# Patient Record
Sex: MALE | Race: WHITE | URBAN - METROPOLITAN AREA
[De-identification: names, ages, dates, MRNs, and addresses within clinical notes are randomized per-mention and may not be internally consistent; named-entity substitution may affect disease eponyms.]

---

## 2018-10-19 ENCOUNTER — FOLLOW UP ESTABLISHED (OUTPATIENT)
Dept: URBAN - METROPOLITAN AREA CLINIC 10 | Facility: CLINIC | Age: 83
End: 2018-10-19
Payer: COMMERCIAL

## 2018-10-19 PROCEDURE — 92082 INTERMEDIATE VISUAL FIELD XM: CPT | Performed by: OPHTHALMOLOGY

## 2018-10-19 PROCEDURE — 92134 CPTRZ OPH DX IMG PST SGM RTA: CPT | Performed by: OPHTHALMOLOGY

## 2018-10-19 PROCEDURE — 92014 COMPRE OPH EXAM EST PT 1/>: CPT | Performed by: OPHTHALMOLOGY

## 2018-10-19 RX ORDER — TIMOLOL MALEATE 5 MG/ML
0.5 % SOLUTION/ DROPS OPHTHALMIC
Qty: 3 | Refills: 1 | Status: INACTIVE
Start: 2018-10-19 | End: 2018-11-13

## 2018-10-19 RX ORDER — LATANOPROST 50 UG/ML
0.005 % SOLUTION OPHTHALMIC
Qty: 3 | Refills: 1 | Status: INACTIVE
Start: 2018-10-19 | End: 2018-11-13

## 2018-10-19 RX ORDER — DORZOLAMIDE HYDROCHLORIDE 20 MG/ML
2 % SOLUTION OPHTHALMIC
Qty: 3 | Refills: 1 | Status: INACTIVE
Start: 2018-10-19 | End: 2018-11-13

## 2018-10-19 ASSESSMENT — INTRAOCULAR PRESSURE
OS: 19
OD: 18

## 2018-10-19 ASSESSMENT — VISUAL ACUITY
OS: 20/30
OD: 20/25

## 2018-10-19 ASSESSMENT — KERATOMETRY
OD: 44.50
OS: 44.13

## 2018-11-13 ENCOUNTER — FOLLOW UP ESTABLISHED (OUTPATIENT)
Dept: URBAN - METROPOLITAN AREA CLINIC 30 | Facility: CLINIC | Age: 83
End: 2018-11-13
Payer: COMMERCIAL

## 2018-11-13 PROCEDURE — 92015 DETERMINE REFRACTIVE STATE: CPT | Performed by: OPHTHALMOLOGY

## 2018-11-13 PROCEDURE — 92083 EXTENDED VISUAL FIELD XM: CPT | Performed by: OPHTHALMOLOGY

## 2018-11-13 PROCEDURE — 92012 INTRM OPH EXAM EST PATIENT: CPT | Performed by: OPHTHALMOLOGY

## 2018-11-13 RX ORDER — TIMOLOL MALEATE 5 MG/ML
0.5 % SOLUTION/ DROPS OPHTHALMIC
Qty: 3 | Refills: 1 | Status: INACTIVE
Start: 2018-11-13 | End: 2021-06-22

## 2018-11-13 RX ORDER — LATANOPROST 50 UG/ML
0.005 % SOLUTION OPHTHALMIC
Qty: 3 | Refills: 1 | Status: INACTIVE
Start: 2018-11-13 | End: 2019-05-23

## 2018-11-13 RX ORDER — DORZOLAMIDE HYDROCHLORIDE 20 MG/ML
2 % SOLUTION OPHTHALMIC
Qty: 3 | Refills: 1 | Status: INACTIVE
Start: 2018-11-13 | End: 2021-06-22

## 2018-11-13 ASSESSMENT — INTRAOCULAR PRESSURE
OS: 16
OD: 16

## 2021-06-22 ENCOUNTER — OFFICE VISIT (OUTPATIENT)
Dept: URBAN - METROPOLITAN AREA CLINIC 30 | Facility: CLINIC | Age: 86
End: 2021-06-22
Payer: COMMERCIAL

## 2021-06-22 PROCEDURE — 92133 CPTRZD OPH DX IMG PST SGM ON: CPT | Performed by: OPHTHALMOLOGY

## 2021-06-22 PROCEDURE — 99214 OFFICE O/P EST MOD 30 MIN: CPT | Performed by: OPHTHALMOLOGY

## 2021-06-22 PROCEDURE — 92134 CPTRZ OPH DX IMG PST SGM RTA: CPT | Performed by: OPHTHALMOLOGY

## 2021-06-22 RX ORDER — DORZOLAMIDE HYDROCHLORIDE 20 MG/ML
2 % SOLUTION OPHTHALMIC
Qty: 6 | Refills: 3 | Status: INACTIVE
Start: 2021-06-22 | End: 2022-01-18

## 2021-06-22 RX ORDER — LATANOPROST 50 UG/ML
0.005 % SOLUTION OPHTHALMIC
Qty: 6 | Refills: 3 | Status: INACTIVE
Start: 2021-06-22 | End: 2022-01-18

## 2021-06-22 RX ORDER — TIMOLOL MALEATE 5 MG/ML
0.5 % SOLUTION/ DROPS OPHTHALMIC
Qty: 6 | Refills: 3 | Status: INACTIVE
Start: 2021-06-22 | End: 2022-01-18

## 2021-06-22 ASSESSMENT — INTRAOCULAR PRESSURE
OD: 18
OS: 17

## 2021-06-22 ASSESSMENT — VISUAL ACUITY
OS: 20/60
OD: 20/30

## 2021-06-22 ASSESSMENT — KERATOMETRY
OD: 44.47
OS: 44.15

## 2021-06-22 NOTE — IMPRESSION/PLAN
Impression: Primary open-angle glaucoma, bilateral, severe stage: J48.9900. Plan: Discussed with patient today's findings. Emphasized and explained compliance with medications. Poor compliance can lead to blindness. 
Cont Lat QHS OU, Timolol QAM OU, Trusopt TID OU
ERX'd

## 2021-09-14 ENCOUNTER — OFFICE VISIT (OUTPATIENT)
Dept: URBAN - METROPOLITAN AREA CLINIC 30 | Facility: CLINIC | Age: 86
End: 2021-09-14
Payer: MEDICARE

## 2021-09-14 DIAGNOSIS — Z96.1 PRESENCE OF INTRAOCULAR LENS: ICD-10-CM

## 2021-09-14 DIAGNOSIS — H40.1133 PRIMARY OPEN-ANGLE GLAUCOMA, BILATERAL, SEVERE STAGE: Primary | ICD-10-CM

## 2021-09-14 PROCEDURE — 92012 INTRM OPH EXAM EST PATIENT: CPT | Performed by: OPHTHALMOLOGY

## 2021-09-14 ASSESSMENT — INTRAOCULAR PRESSURE
OD: 13
OS: 12

## 2021-09-14 NOTE — IMPRESSION/PLAN
Impression: Primary open-angle glaucoma, bilateral, severe stage: V88.0526. Plan: Discussed with patient today's findings. Emphasized and explained compliance with medications. Poor compliance can lead to blindness. Cont Lat QHS OU, Timolol QAM OU, Trusopt TID OU
VF not working today See Dr Lauren Payment

## 2021-11-17 ENCOUNTER — OFFICE VISIT (OUTPATIENT)
Dept: URBAN - METROPOLITAN AREA CLINIC 24 | Facility: CLINIC | Age: 86
End: 2021-11-17
Payer: MEDICARE

## 2021-11-17 PROCEDURE — 92133 CPTRZD OPH DX IMG PST SGM ON: CPT | Performed by: OPHTHALMOLOGY

## 2021-11-17 PROCEDURE — 92020 GONIOSCOPY: CPT | Performed by: OPHTHALMOLOGY

## 2021-11-17 PROCEDURE — 92083 EXTENDED VISUAL FIELD XM: CPT | Performed by: OPHTHALMOLOGY

## 2021-11-17 PROCEDURE — 99214 OFFICE O/P EST MOD 30 MIN: CPT | Performed by: OPHTHALMOLOGY

## 2021-11-17 ASSESSMENT — INTRAOCULAR PRESSURE
OS: 18
OD: 17

## 2021-11-17 NOTE — IMPRESSION/PLAN
Impression: Primary open-angle glaucoma, bilateral, severe stage: O76.0974. Plan: Pt has Glaucoma    Gonio : SS 1+       Pachs:550/570      Today's IOP :17/18 (did not use drops today)          Tmax  :  32/34 Target IOP low to mid teens Pt denies Fhx of Glaucoma Right  eye is the better seeing eye (Last vf OD inferior and nasal changes OS dense loss 11/17/21 C/D:  0.9x0.9 360 PPA
OCT:59/59 Pt denies Sulfa Allergy   // Pt denies Lung /Heart dx Plan :
1. Continue Lat QHS OU Timolol QAM OU Trusopt TID OU 2. Discussed with patient IOP is high however he confirms he did not use drops today. Will have patient return sooner to confirm IOP is controlled. If stable will have patient return to referring provider for medical management 3. Discussed details about Glaucoma and that without proper control of pressures irreversible blindness can occur. Patient understands risks. Emphasize compliance with drop and without compliance vision loss progression can occur. 
4. RTC 2 month IOP

## 2022-01-12 ENCOUNTER — OFFICE VISIT (OUTPATIENT)
Dept: URBAN - METROPOLITAN AREA CLINIC 24 | Facility: CLINIC | Age: 87
End: 2022-01-12
Payer: MEDICARE

## 2022-01-12 PROCEDURE — 99214 OFFICE O/P EST MOD 30 MIN: CPT | Performed by: OPHTHALMOLOGY

## 2022-01-12 NOTE — IMPRESSION/PLAN
Impression: Primary open-angle glaucoma, bilateral, severe stage: E45.0815. Plan: Pt has Glaucoma    Gonio : SS 1+       Pachs:550/570      Today's IOP :16/18       Tmax  :  32/34 Target IOP low to mid teens Pt denies Fhx of Glaucoma Right  eye is the better seeing eye (Last vf OD inferior and nasal changes OS dense loss 11/17/21 C/D:  0.9x0.9 360 PPA
OCT:59/59 Pt denies Sulfa Allergy   // Pt denies Lung /Heart dx Plan :
1. Continue Lat QHS OU Timolol QAM OU Trusopt TID OU 2. Discussed with patient IOP is high even with drop compliance. Recommend additional treatment 3. Recommend SLT OD then OS  360 The patient is aware of the limitations of SLT , which can lower IOP 2-4mm for 6--12 months. The Patient is aware that SLT cannot improve the vision nor eliminate the need for the topical medications. If on any glaucoma medications, the patient is aware that SLT is not a replacement for the current medical regimen. **Risk level 1
*** 6-8 week Follow up after laser **Pred TID x 1 week

## 2022-01-28 ENCOUNTER — SURGERY (OUTPATIENT)
Dept: URBAN - METROPOLITAN AREA SURGERY 12 | Facility: SURGERY | Age: 87
End: 2022-01-28
Payer: MEDICARE

## 2022-01-28 PROCEDURE — 65855 TRABECULOPLASTY LASER SURG: CPT | Performed by: OPHTHALMOLOGY

## 2022-01-28 RX ORDER — PREDNISOLONE ACETATE 10 MG/ML
1 % SUSPENSION/ DROPS OPHTHALMIC
Qty: 1 | Refills: 0 | Status: ACTIVE
Start: 2022-01-28

## 2022-02-11 ENCOUNTER — SURGERY (OUTPATIENT)
Dept: URBAN - METROPOLITAN AREA SURGERY 12 | Facility: SURGERY | Age: 87
End: 2022-02-11
Payer: MEDICARE

## 2022-02-11 PROCEDURE — 65855 TRABECULOPLASTY LASER SURG: CPT | Performed by: OPHTHALMOLOGY

## 2022-04-04 ENCOUNTER — OFFICE VISIT (OUTPATIENT)
Dept: URBAN - METROPOLITAN AREA CLINIC 24 | Facility: CLINIC | Age: 87
End: 2022-04-04
Payer: MEDICARE

## 2022-04-04 PROCEDURE — 99213 OFFICE O/P EST LOW 20 MIN: CPT | Performed by: OPHTHALMOLOGY

## 2022-04-04 ASSESSMENT — INTRAOCULAR PRESSURE
OD: 10
OS: 12

## 2022-04-04 NOTE — IMPRESSION/PLAN
Impression: Primary open-angle glaucoma, bilateral, severe stage: M14.5090.
-s/p SLT OU 2022 Plan: Pt has Glaucoma    Gonio : SS 1+       Pachs:550/570      Today's IOP : 10/12       Tmax  :  32/34 Target IOP low to mid teens Pt denies Fhx of Glaucoma Right  eye is the better seeing eye (Last vf OD inferior and nasal changes OS dense loss 11/17/21 C/D:  0.9x0.9 360 PPA
OCT:59/59 Pt denies Sulfa Allergy // Pt denies Lung /Heart dx Plan :
1. Continue Latanoprost QHS OU Timolol QAM OU Trusopt TID OU
2. s/p SLT OU and IOP is doing well today 04/04/22 3.  RTC in 3-4 months with Dr. Michel Garcia for IOP check with VF testing

## 2022-05-04 ENCOUNTER — OFFICE VISIT (OUTPATIENT)
Dept: URBAN - METROPOLITAN AREA CLINIC 24 | Facility: CLINIC | Age: 87
End: 2022-05-04
Payer: MEDICARE

## 2022-05-04 DIAGNOSIS — H40.1133 PRIMARY OPEN-ANGLE GLAUCOMA, BILATERAL, SEVERE STAGE: Primary | ICD-10-CM

## 2022-05-04 PROCEDURE — 99213 OFFICE O/P EST LOW 20 MIN: CPT | Performed by: OPHTHALMOLOGY

## 2022-05-04 ASSESSMENT — INTRAOCULAR PRESSURE
OD: 15
OS: 22

## 2022-05-04 NOTE — IMPRESSION/PLAN
Impression: Primary open-angle glaucoma, bilateral, severe stage: H40.1133.
-s/p SLT OU 2022 Plan: Pt has Glaucoma    Gonio : SS 1+       Pachs:550/570      Today's IOP :15/22       Tmax  :  32/34 Target IOP low to mid teens Pt denies Fhx of Glaucoma Right  eye is the better seeing eye (Last vf OD inferior and nasal changes OS dense loss 11/17/21 C/D:  0.9x0.9 360 PPA
OCT:59/59 Pt denies Sulfa Allergy // Pt denies Lung /Heart dx Plan :
1. Continue Latanoprost QHS OU Timolol QAM OU Trusopt TID OU
2. IOP high today will have patient return sooner to 3.  RTC in 4-6 weeks with Dr. Shabnam Mg for IOP check with VF testing

## 2022-08-05 ENCOUNTER — OFFICE VISIT (OUTPATIENT)
Dept: URBAN - METROPOLITAN AREA CLINIC 30 | Facility: CLINIC | Age: 87
End: 2022-08-05
Payer: MEDICARE

## 2022-08-05 DIAGNOSIS — H40.1133 PRIMARY OPEN-ANGLE GLAUCOMA, BILATERAL, SEVERE STAGE: Primary | ICD-10-CM

## 2022-08-05 PROCEDURE — 99213 OFFICE O/P EST LOW 20 MIN: CPT | Performed by: OPHTHALMOLOGY

## 2022-08-05 ASSESSMENT — INTRAOCULAR PRESSURE
OS: 16
OD: 16

## 2022-08-05 NOTE — IMPRESSION/PLAN
Impression: Primary open-angle glaucoma, bilateral, severe stage: H40.1133.
-s/p SLT OU 2022 Plan: Pt has Glaucoma    Gonio : SS 1+       Pachs:550/570      Today's IOP :15/22       Tmax  :  32/34 Target IOP low to mid teens Pt denies Fhx of Glaucoma Right  eye is the better seeing eye (Last vf OD inferior and nasal changes OS dense loss 11/17/21 C/D:  0.9x0.9 360 PPA
OCT:59/59 Pt denies Sulfa Allergy // Pt denies Lung /Heart dx Plan :
1. Continue Latanoprost QHS OU Timolol QAM OU Trusopt TID OU
2. IOP high today will have patient return sooner to 3.  RTC in 3 months  with Dr. Dhaval Keller for IOP check with RNFL & VF testing OD 24-2 OS 10-2

## 2022-11-09 ENCOUNTER — OFFICE VISIT (OUTPATIENT)
Dept: URBAN - METROPOLITAN AREA CLINIC 30 | Facility: CLINIC | Age: 87
End: 2022-11-09
Payer: MEDICARE

## 2022-11-09 DIAGNOSIS — H40.1133 PRIMARY OPEN-ANGLE GLAUCOMA, BILATERAL, SEVERE STAGE: Primary | ICD-10-CM

## 2022-11-09 PROCEDURE — 99214 OFFICE O/P EST MOD 30 MIN: CPT | Performed by: OPHTHALMOLOGY

## 2022-11-09 RX ORDER — BRINZOLAMIDE/BRIMONIDINE TARTRATE 10; 2 MG/ML; MG/ML
SUSPENSION/ DROPS OPHTHALMIC
Qty: 5 | Refills: 3 | Status: ACTIVE
Start: 2022-11-09

## 2022-11-09 ASSESSMENT — INTRAOCULAR PRESSURE
OS: 24
OD: 18

## 2022-11-09 NOTE — IMPRESSION/PLAN
Impression: Primary open-angle glaucoma, bilateral, severe stage: H40.1133.
-s/p SLT OU 2022 Plan: Pt has Glaucoma    Gonio : SS 1+       Pachs:550/570      Today's IOP : 18/24       Tmax  :  32/34 Target IOP low to mid teens Pt denies Fhx of Glaucoma Right  eye is the better seeing eye (Last vf OD inferior and nasal changes OS dense loss 11/9/22 C/D:  0.9x0.9 360 PPA
OCT: 60/67 (11/9/22) Pt denies Sulfa Allergy // Pt denies Lung /Heart dx Plan :
1. Continue Latanoprost QHS OU Timolol QAM OU Trusopt/Dorzolamide TID OU - change to Simbrinza TID OU
2. IOP high again today - add Brimonidine 3.  Return in 1-2 months for IOP check with med change